# Patient Record
Sex: FEMALE | Race: WHITE | Employment: FULL TIME | URBAN - METROPOLITAN AREA
[De-identification: names, ages, dates, MRNs, and addresses within clinical notes are randomized per-mention and may not be internally consistent; named-entity substitution may affect disease eponyms.]

---

## 2024-04-23 LAB
MEV IGG SER-ACNC: NORMAL
MUV IGG SER IA-ACNC: NORMAL
RUBV IGG SERPL QL IA: NORMAL IU/ML
VZV IGG SER QL IA: NORMAL

## 2024-07-30 ENCOUNTER — HOSPITAL ENCOUNTER (EMERGENCY)
Age: 24
Discharge: HOME | End: 2024-07-30
Attending: EMERGENCY MEDICINE
Payer: MEDICAID

## 2024-07-30 ENCOUNTER — APPOINTMENT (OUTPATIENT)
Dept: GENERAL RADIOLOGY | Age: 24
End: 2024-07-30
Payer: MEDICAID

## 2024-07-30 VITALS
HEART RATE: 68 BPM | DIASTOLIC BLOOD PRESSURE: 70 MMHG | RESPIRATION RATE: 19 BRPM | SYSTOLIC BLOOD PRESSURE: 121 MMHG | OXYGEN SATURATION: 100 % | TEMPERATURE: 98 F | HEIGHT: 63 IN | BODY MASS INDEX: 21.26 KG/M2 | WEIGHT: 120 LBS

## 2024-07-30 DIAGNOSIS — R42 LIGHTHEADEDNESS: Primary | ICD-10-CM

## 2024-07-30 DIAGNOSIS — E86.0 DEHYDRATION: ICD-10-CM

## 2024-07-30 LAB
ALBUMIN SERPL-MCNC: 4.8 G/DL (ref 3.5–5.2)
ALP SERPL-CCNC: 47 U/L (ref 35–104)
ALT SERPL-CCNC: 15 U/L (ref 0–32)
AMPHET UR QL SCN: NEGATIVE
ANION GAP SERPL CALCULATED.3IONS-SCNC: 9 MMOL/L (ref 7–16)
APAP SERPL-MCNC: 18 UG/ML (ref 10–30)
AST SERPL-CCNC: 22 U/L (ref 0–31)
B PARAP IS1001 DNA NPH QL NAA+NON-PROBE: NOT DETECTED
B PERT DNA SPEC QL NAA+PROBE: NOT DETECTED
BACTERIA URNS QL MICRO: ABNORMAL
BARBITURATES UR QL SCN: NEGATIVE
BASOPHILS # BLD: 0.04 K/UL (ref 0–0.2)
BASOPHILS NFR BLD: 1 % (ref 0–2)
BENZODIAZ UR QL: NEGATIVE
BILIRUB SERPL-MCNC: 0.4 MG/DL (ref 0–1.2)
BILIRUB UR QL STRIP: NEGATIVE
BUN SERPL-MCNC: 11 MG/DL (ref 6–20)
BUPRENORPHINE UR QL: NEGATIVE
C PNEUM DNA NPH QL NAA+NON-PROBE: NOT DETECTED
CALCIUM SERPL-MCNC: 9.1 MG/DL (ref 8.6–10.2)
CANNABINOIDS UR QL SCN: NEGATIVE
CHLORIDE SERPL-SCNC: 102 MMOL/L (ref 98–107)
CLARITY UR: CLEAR
CO2 SERPL-SCNC: 23 MMOL/L (ref 22–29)
COCAINE UR QL SCN: NEGATIVE
COLOR UR: YELLOW
CREAT SERPL-MCNC: 0.6 MG/DL (ref 0.5–1)
CRYSTALS URNS MICRO: ABNORMAL /HPF
EOSINOPHIL # BLD: 0.08 K/UL (ref 0.05–0.5)
EOSINOPHILS RELATIVE PERCENT: 1 % (ref 0–6)
EPI CELLS #/AREA URNS HPF: ABNORMAL /HPF
ERYTHROCYTE [DISTWIDTH] IN BLOOD BY AUTOMATED COUNT: 12.3 % (ref 11.5–15)
ETHANOLAMINE SERPL-MCNC: <10 MG/DL (ref 0–0.08)
FENTANYL UR QL: NEGATIVE
FLUAV RNA NPH QL NAA+NON-PROBE: NOT DETECTED
FLUBV RNA NPH QL NAA+NON-PROBE: NOT DETECTED
GFR, ESTIMATED: >90 ML/MIN/1.73M2
GLUCOSE BLD-MCNC: 101 MG/DL (ref 74–99)
GLUCOSE SERPL-MCNC: 111 MG/DL (ref 74–99)
GLUCOSE UR STRIP-MCNC: NEGATIVE MG/DL
HADV DNA NPH QL NAA+NON-PROBE: NOT DETECTED
HCG, URINE, POC: NEGATIVE
HCOV 229E RNA NPH QL NAA+NON-PROBE: NOT DETECTED
HCOV HKU1 RNA NPH QL NAA+NON-PROBE: NOT DETECTED
HCOV NL63 RNA NPH QL NAA+NON-PROBE: NOT DETECTED
HCOV OC43 RNA NPH QL NAA+NON-PROBE: NOT DETECTED
HCT VFR BLD AUTO: 36.4 % (ref 34–48)
HGB BLD-MCNC: 12.2 G/DL (ref 11.5–15.5)
HGB UR QL STRIP.AUTO: NEGATIVE
HMPV RNA NPH QL NAA+NON-PROBE: NOT DETECTED
HPIV1 RNA NPH QL NAA+NON-PROBE: NOT DETECTED
HPIV2 RNA NPH QL NAA+NON-PROBE: NOT DETECTED
HPIV3 RNA NPH QL NAA+NON-PROBE: NOT DETECTED
HPIV4 RNA NPH QL NAA+NON-PROBE: NOT DETECTED
IMM GRANULOCYTES # BLD AUTO: <0.03 K/UL (ref 0–0.58)
IMM GRANULOCYTES NFR BLD: 0 % (ref 0–5)
KETONES UR STRIP-MCNC: ABNORMAL MG/DL
LEUKOCYTE ESTERASE UR QL STRIP: NEGATIVE
LYMPHOCYTES NFR BLD: 2.13 K/UL (ref 1.5–4)
LYMPHOCYTES RELATIVE PERCENT: 35 % (ref 20–42)
Lab: NORMAL
M PNEUMO DNA NPH QL NAA+NON-PROBE: NOT DETECTED
MCH RBC QN AUTO: 31 PG (ref 26–35)
MCHC RBC AUTO-ENTMCNC: 33.5 G/DL (ref 32–34.5)
MCV RBC AUTO: 92.4 FL (ref 80–99.9)
METHADONE UR QL: NEGATIVE
MONOCYTES NFR BLD: 0.42 K/UL (ref 0.1–0.95)
MONOCYTES NFR BLD: 7 % (ref 2–12)
MUCOUS THREADS URNS QL MICRO: PRESENT
NEGATIVE QC PASS/FAIL: NORMAL
NEUTROPHILS NFR BLD: 56 % (ref 43–80)
NEUTS SEG NFR BLD: 3.43 K/UL (ref 1.8–7.3)
NITRITE UR QL STRIP: NEGATIVE
OPIATES UR QL SCN: NEGATIVE
OXYCODONE UR QL SCN: NEGATIVE
PCP UR QL SCN: NEGATIVE
PH UR STRIP: 6 [PH] (ref 5–9)
PLATELET # BLD AUTO: 211 K/UL (ref 130–450)
PMV BLD AUTO: 10.6 FL (ref 7–12)
POSITIVE QC PASS/FAIL: NORMAL
POTASSIUM SERPL-SCNC: 3.8 MMOL/L (ref 3.5–5)
PROT SERPL-MCNC: 7.8 G/DL (ref 6.4–8.3)
PROT UR STRIP-MCNC: NEGATIVE MG/DL
RBC # BLD AUTO: 3.94 M/UL (ref 3.5–5.5)
RBC #/AREA URNS HPF: ABNORMAL /HPF
RSV RNA NPH QL NAA+NON-PROBE: NOT DETECTED
RV+EV RNA NPH QL NAA+NON-PROBE: NOT DETECTED
SALICYLATES SERPL-MCNC: <0.3 MG/DL (ref 0–30)
SARS-COV-2 RNA NPH QL NAA+NON-PROBE: NOT DETECTED
SODIUM SERPL-SCNC: 134 MMOL/L (ref 132–146)
SP GR UR STRIP: >1.03 (ref 1–1.03)
SPECIMEN DESCRIPTION: NORMAL
T4 FREE SERPL-MCNC: 1.6 NG/DL (ref 0.9–1.7)
TEST INFORMATION: NORMAL
TOXIC TRICYCLIC SC,BLOOD: NEGATIVE
TSH SERPL DL<=0.05 MIU/L-ACNC: 1.69 UIU/ML (ref 0.27–4.2)
UROBILINOGEN UR STRIP-ACNC: 0.2 EU/DL (ref 0–1)
WBC #/AREA URNS HPF: ABNORMAL /HPF
WBC OTHER # BLD: 6.1 K/UL (ref 4.5–11.5)

## 2024-07-30 PROCEDURE — 71046 X-RAY EXAM CHEST 2 VIEWS: CPT

## 2024-07-30 PROCEDURE — 93005 ELECTROCARDIOGRAM TRACING: CPT | Performed by: EMERGENCY MEDICINE

## 2024-07-30 PROCEDURE — 82962 GLUCOSE BLOOD TEST: CPT

## 2024-07-30 PROCEDURE — 84443 ASSAY THYROID STIM HORMONE: CPT

## 2024-07-30 PROCEDURE — 2580000003 HC RX 258: Performed by: EMERGENCY MEDICINE

## 2024-07-30 PROCEDURE — G0480 DRUG TEST DEF 1-7 CLASSES: HCPCS

## 2024-07-30 PROCEDURE — 81001 URINALYSIS AUTO W/SCOPE: CPT

## 2024-07-30 PROCEDURE — 0202U NFCT DS 22 TRGT SARS-COV-2: CPT

## 2024-07-30 PROCEDURE — 80307 DRUG TEST PRSMV CHEM ANLYZR: CPT

## 2024-07-30 PROCEDURE — 84439 ASSAY OF FREE THYROXINE: CPT

## 2024-07-30 PROCEDURE — 80143 DRUG ASSAY ACETAMINOPHEN: CPT

## 2024-07-30 PROCEDURE — 80179 DRUG ASSAY SALICYLATE: CPT

## 2024-07-30 PROCEDURE — 99285 EMERGENCY DEPT VISIT HI MDM: CPT

## 2024-07-30 PROCEDURE — 80053 COMPREHEN METABOLIC PANEL: CPT

## 2024-07-30 PROCEDURE — 85025 COMPLETE CBC W/AUTO DIFF WBC: CPT

## 2024-07-30 RX ORDER — 0.9 % SODIUM CHLORIDE 0.9 %
1000 INTRAVENOUS SOLUTION INTRAVENOUS ONCE
Status: COMPLETED | OUTPATIENT
Start: 2024-07-30 | End: 2024-07-30

## 2024-07-30 RX ADMIN — SODIUM CHLORIDE 1000 ML: 9 INJECTION, SOLUTION INTRAVENOUS at 17:11

## 2024-07-30 ASSESSMENT — LIFESTYLE VARIABLES
HOW MANY STANDARD DRINKS CONTAINING ALCOHOL DO YOU HAVE ON A TYPICAL DAY: 1 OR 2
HOW OFTEN DO YOU HAVE A DRINK CONTAINING ALCOHOL: MONTHLY OR LESS

## 2024-07-30 NOTE — ED PROVIDER NOTES
ED PROVIDER NOTE    Chief Complaint   Patient presents with    Dizziness     Patient from work with dizziness, lightheaded, pale, nausea, and weakness. Patient also complains of headache and neck pain. Patient stated she has not been drinking a lot of water. Patient could barely stand with assistance.       HPI:  7/30/24,   Time: 5:04 PM EDT       Dalton Phillips is a 24 y.o. female presenting to the ED for generalized weakness, fatigue, lightheadedness. Ongoing since this morning, worsening throughout the day. No hx of prior similar episodes. Denies associated fever, chills, cough, chest pain, shortness of breath, abdominal pain, vomiting, diarrhea. +nausea. No drug/etoh use. No medications. Just takes vitamins. Decreased appetite and po intake. Normal urine output.    Chart review: no prior medical records on file in [facility]      Review of Systems:     Review of Systems  Pertinent positives and negatives as stated in HPI     --------------------------------------------- PAST HISTORY ---------------------------------------------  Past Medical History:   History reviewed. No pertinent past medical history.    Past Surgical History:   History reviewed. No pertinent surgical history.    Social History:   Social History     Tobacco Use    Smoking status: Never    Smokeless tobacco: Never   Vaping Use    Vaping Use: Never used   Substance and Sexual Activity    Alcohol use: Yes    Drug use: Never    Sexual activity: Defer       Family History:   History reviewed. No pertinent family history.    The patient’s home medications have been reviewed.    Allergies:   No Known Allergies        ---------------------------------------------------PHYSICAL EXAM--------------------------------------    /85   Pulse 73   Temp 98 °F (36.7 °C) (Oral)   Resp 19   Ht 1.6 m (5' 3\")   Wt 54.4 kg (120 lb)   SpO2 98%   BMI 21.26 kg/m²     Physical Exam  Vitals and nursing note reviewed.   Constitutional:       General: She is not

## 2024-07-31 LAB
EKG ATRIAL RATE: 71 BPM
EKG P AXIS: 83 DEGREES
EKG P-R INTERVAL: 198 MS
EKG Q-T INTERVAL: 384 MS
EKG QRS DURATION: 74 MS
EKG QTC CALCULATION (BAZETT): 417 MS
EKG R AXIS: 78 DEGREES
EKG T AXIS: 67 DEGREES
EKG VENTRICULAR RATE: 71 BPM

## 2024-07-31 PROCEDURE — 93010 ELECTROCARDIOGRAM REPORT: CPT | Performed by: INTERNAL MEDICINE

## 2024-07-31 NOTE — DISCHARGE INSTRUCTIONS
Return to the emergency department if you have chest pain, difficulty breathing, vomiting, unable to keep down food or drink, dizziness, lightheadedness, passing out, numbness or weakness in your extremities, difficulty walking, talking, or seeing, or any other new or concerning symptoms